# Patient Record
Sex: MALE | Race: WHITE | NOT HISPANIC OR LATINO | URBAN - NONMETROPOLITAN AREA
[De-identification: names, ages, dates, MRNs, and addresses within clinical notes are randomized per-mention and may not be internally consistent; named-entity substitution may affect disease eponyms.]

---

## 2024-09-13 ENCOUNTER — OFFICE VISIT (OUTPATIENT)
Dept: URBAN - NONMETROPOLITAN AREA CLINIC 13 | Facility: CLINIC | Age: 59
End: 2024-09-13
Payer: COMMERCIAL

## 2024-09-13 VITALS
DIASTOLIC BLOOD PRESSURE: 75 MMHG | HEART RATE: 74 BPM | SYSTOLIC BLOOD PRESSURE: 119 MMHG | BODY MASS INDEX: 35.34 KG/M2 | WEIGHT: 252.4 LBS | HEIGHT: 71 IN

## 2024-09-13 DIAGNOSIS — K51.30 CHRONIC ULCERATIVE RECTOSIGMOIDITIS: ICD-10-CM

## 2024-09-13 DIAGNOSIS — Z12.11 COLON CANCER SCREENING: ICD-10-CM

## 2024-09-13 PROCEDURE — 99213 OFFICE O/P EST LOW 20 MIN: CPT | Performed by: NURSE PRACTITIONER

## 2024-09-13 RX ORDER — ATORVASTATIN CALCIUM 10 MG/1
TABLET, FILM COATED ORAL
Qty: 0 | Refills: 0 | COMMUNITY
Start: 1900-01-01

## 2024-09-13 RX ORDER — METFORMIN HYDROCHLORIDE 500 MG/1
TABLET, COATED ORAL
Qty: 0 | Refills: 0 | COMMUNITY
Start: 1900-01-01

## 2024-09-13 RX ORDER — ZOLPIDEM TARTRATE 10 MG/1
TAKE 1 TABLET BY MOUTH EVERY DAY AT BEDTIME AS NEEDED FOR 90 DAYS TABLET ORAL
Qty: 90 EACH | Refills: 0 | Status: ACTIVE | COMMUNITY

## 2024-09-13 RX ORDER — METFORMIN ER 500 MG 500 MG/1
TABLET ORAL
Qty: 360 TABLET | Status: ACTIVE | COMMUNITY

## 2024-09-13 RX ORDER — GABAPENTIN 300 MG/1
CAPSULE ORAL
Qty: 360 CAPSULE | Status: ACTIVE | COMMUNITY

## 2024-09-13 RX ORDER — SULFASALAZINE 500 MG/1
TAKE 1 TABLET BY MOUTH 4  TIMES DAILY AFTER MEALS TABLET ORAL
Qty: 360 | Refills: 3

## 2024-09-13 RX ORDER — TRAMADOL HYDROCHLORIDE 50 MG/1
TAKE 1 TABLET BY MOUTH AT BEDTIME AS NEEDED FOR PAIN TABLET, FILM COATED ORAL
Qty: 20 EACH | Refills: 0 | Status: ACTIVE | COMMUNITY

## 2024-09-13 RX ORDER — TIRZEPATIDE 12.5 MG/.5ML
INJECT 0.5 MLS (12.5 MG TOTAL) INTO THE SKIN EVERY 7 DAYS INJECTION, SOLUTION SUBCUTANEOUS
Qty: 6 MILLILITER | Refills: 0 | Status: ACTIVE | COMMUNITY

## 2024-09-13 RX ORDER — LOSARTAN POTASSIUM 25 MG/1
TABLET, FILM COATED ORAL
Qty: 90 TABLET | Status: ACTIVE | COMMUNITY

## 2024-09-13 RX ORDER — PRAMIPEXOLE DIHYDROCHLORIDE 0.5 MG/1
TAKE 1 TABLET BY MOUTH BEFORE BEDTIME ONCE DAILY TABLET ORAL
Qty: 90 EACH | Refills: 1 | Status: ACTIVE | COMMUNITY

## 2024-09-13 RX ORDER — ATORVASTATIN CALCIUM 40 MG/1
TABLET, FILM COATED ORAL
Qty: 90 TABLET | Status: ACTIVE | COMMUNITY

## 2024-09-13 RX ORDER — LISINOPRIL 5 MG/1
TABLET ORAL
Qty: 0 | Refills: 0 | COMMUNITY
Start: 1900-01-01

## 2024-09-13 RX ORDER — ZOLPIDEM TARTRATE 10 MG/1
TABLET, FILM COATED ORAL
Qty: 0 | Refills: 0 | Status: ACTIVE | COMMUNITY
Start: 1900-01-01

## 2024-09-13 RX ORDER — SULFASALAZINE 500 MG/1
TAKE 1 TABLET BY MOUTH 4  TIMES DAILY AFTER MEALS TABLET ORAL
Qty: 360 | Refills: 3 | Status: ACTIVE | COMMUNITY

## 2024-09-13 RX ORDER — LEVOTHYROXINE SODIUM 100 UG/1
TABLET ORAL
Qty: 90 TABLET | Status: ACTIVE | COMMUNITY

## 2024-09-13 NOTE — HPI-TODAY'S VISIT:
9/123/2024 Mr. Leyva is here for UC f/u. He was last seen a year ago. He is on sulfasalazine and his symptoms have been well controlled. He has fairly normal BM.He will have diarrhea and urgency at times. This is only every few weeks. He denies any recent flares. HIs labs have been stable with Dr Short. He has lost another 50 pounds- total of 132.  He has been enjoying group home and traveling. CS

## 2024-09-13 NOTE — HPI-OTHER HISTORIES
3/2019   Mr. Kane Leyva is here for f/u of ulcerative colitis. He was first dx in 2015 with Dr. Arthur colonoscopy with moderately severe colitis in the sigmoid colon.  He was started on lialda 2.4mg daily for maintenance. He had been doing well with a normal formed stool daily. He had another flare and his lialda was increased to 4.8gm a day. His diarrhea continued. He had a colonoscopy with patchy inflammation in the sigmoid colon. 30cm to 5cm. Path was consistent with UC. He was given a round of uceris in 2017. Over the last year, he had been on lialda but due to high deductable he stopped therapy. He has done well until the last 2 weeks. He is having diarrhea more times than he can count. He denies any blood in his stool. His labs showed elevated inflammatory markers and stool studies were negative. Uceris and apriso were called in. These were too expensive.  CS   2/23/2021 Mr. Leyva is here for UC f/u. He was last seen in 2019. He was having flare ups and his mesalamine was too expensive. He was started on sulfasalazine and his symptoms have been well controlled. He has 1-2 formed, nonbloody, nonurgent BM a day. He will have diarrhea and urgency after certains foods- like a kind bar. He takes dicyclomine for this and it helps. His symptoms never last more than a day. He denies any abdominal pain. He gets labs every 6 months with Dr Short. CS  2/24/2022 Mr. Leyva is here for UC f/u. He was last seen a year ago. He is on sulfasalazine and his symptoms have been well controlled. He has 1-2 formed, nonbloody, nonurgent BM a day. He will have diarrhea and urgency after certains foods. He is taking bentyl TID and this helps and only lasts for a day. He denies any recent flares. HIs labs have been stable with Dr Short. Overall, he feels well and due for colonoscopy. He would like to do this in June- he is going on a river cruise in May. CS  6/17/2022 Colonoscopy: sigmoid diverticula, pseudopolyps in sigmoid, otherwise normal. Path No acitve colitis.  9/12/2023 Mr. Leyva is here for UC f/u. He was last seen a year ago. He is on sulfasalazine and his symptoms have been well controlled. He has fairly normal BM.He will have diarrhea and urgency after certains foods. He is taking bentyl TID and this helps. He denies any recent flares. HIs labs have been stable with Dr Short but no recent CBC. Overall, he feels well. His colonoscopy last year showed no active colitis. He has been on ozempic and now monjuro and lost 60-80 pounds. CS